# Patient Record
Sex: FEMALE | Race: WHITE | NOT HISPANIC OR LATINO | Employment: OTHER | URBAN - METROPOLITAN AREA
[De-identification: names, ages, dates, MRNs, and addresses within clinical notes are randomized per-mention and may not be internally consistent; named-entity substitution may affect disease eponyms.]

---

## 2019-01-08 NOTE — PATIENT DISCUSSION
[employer] grid at home check at least once a week. MVI/AREDS discussed. Patient to call if any changes in vision or grid card.

## 2021-11-10 ENCOUNTER — NEW PATIENT COMPREHENSIVE (OUTPATIENT)
Dept: URBAN - METROPOLITAN AREA CLINIC 47 | Facility: CLINIC | Age: 26
End: 2021-11-10

## 2021-11-10 DIAGNOSIS — H52.13: ICD-10-CM

## 2021-11-10 PROCEDURE — 92015 DETERMINE REFRACTIVE STATE: CPT

## 2021-11-10 PROCEDURE — 92004 COMPRE OPH EXAM NEW PT 1/>: CPT

## 2021-11-10 ASSESSMENT — KERATOMETRY
OS_AXISANGLE2_DEGREES: 155
OS_AXISANGLE_DEGREES: 65
OD_K1POWER_DIOPTERS: 41.50
OD_K2POWER_DIOPTERS: 42.50
OD_AXISANGLE_DEGREES: 110
OS_K2POWER_DIOPTERS: 42.25
OD_AXISANGLE2_DEGREES: 20
OS_K1POWER_DIOPTERS: 41.75

## 2021-11-10 ASSESSMENT — VISUAL ACUITY
OU_SC: J1
OD_CC: 20/20
OS_SC: J1
OD_SC: 20/60
OD_SC: J2
OU_SC: 20/50
OS_CC: 20/20
OS_SC: 20/60

## 2021-11-10 ASSESSMENT — TONOMETRY
OS_IOP_MMHG: 12
OD_IOP_MMHG: 12